# Patient Record
Sex: FEMALE | Race: WHITE | NOT HISPANIC OR LATINO | URBAN - METROPOLITAN AREA
[De-identification: names, ages, dates, MRNs, and addresses within clinical notes are randomized per-mention and may not be internally consistent; named-entity substitution may affect disease eponyms.]

---

## 2017-03-23 ENCOUNTER — OFFICE (OUTPATIENT)
Dept: URBAN - METROPOLITAN AREA CLINIC 11 | Facility: CLINIC | Age: 67
End: 2017-03-23
Payer: MEDICARE

## 2017-03-23 VITALS
HEIGHT: 66 IN | SYSTOLIC BLOOD PRESSURE: 147 MMHG | WEIGHT: 230 LBS | HEART RATE: 76 BPM | DIASTOLIC BLOOD PRESSURE: 62 MMHG

## 2017-03-23 DIAGNOSIS — R10.13 EPIGASTRIC PAIN: ICD-10-CM

## 2017-03-23 DIAGNOSIS — R13.19 OTHER DYSPHAGIA: ICD-10-CM

## 2017-03-23 PROCEDURE — 99213 OFFICE O/P EST LOW 20 MIN: CPT | Performed by: INTERNAL MEDICINE

## 2017-03-23 PROCEDURE — G8427 DOCREV CUR MEDS BY ELIG CLIN: HCPCS | Performed by: INTERNAL MEDICINE

## 2017-03-23 NOTE — SERVICENOTES
We discussed her abdominal pain and dysphagia.  I would like to do the EGD with dilation (?occult ring or motility issue) and to evaulate for gastritis/ulcer disease/candidiasis, etc....  We reveiewed her prior studies and discussed the EGD/dilation in detail.

## 2017-03-23 NOTE — SERVICEHPINOTES
She has had issues again with foods feeling like the stop in her mid chest. She will then drink water and it "feels like the water goes around it".  Sometimes the liquid will come back up but not the food.  This happenns about twice a week.  She has not had heartburn issues.  Her last EGD was in 2/16 without strictures.  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  She has the symptoms after eating and feels like she is going to explode.  She started having issues abou a month ago.  She has not had associated n/v with it.  She has not been on NSAIDs but was started on Remicade in February. She has been on MXT.  The pain comes and goes.  Sometimes he pain is in the RUQ as well. She has had a cholecytectomy but the pain is similar to her gall bladder pain.

## 2017-03-27 ENCOUNTER — AMBULATORY SURGICAL CENTER (OUTPATIENT)
Dept: URBAN - METROPOLITAN AREA SURGERY 3 | Facility: SURGERY | Age: 67
End: 2017-03-27
Payer: MEDICARE

## 2017-03-27 ENCOUNTER — AMBULATORY SURGICAL CENTER (OUTPATIENT)
Dept: URBAN - METROPOLITAN AREA SURGERY 3 | Facility: SURGERY | Age: 67
End: 2017-03-27

## 2017-03-27 ENCOUNTER — OFFICE (OUTPATIENT)
Dept: URBAN - METROPOLITAN AREA CLINIC 11 | Facility: CLINIC | Age: 67
End: 2017-03-27

## 2017-03-27 VITALS
OXYGEN SATURATION: 98 % | HEART RATE: 66 BPM | DIASTOLIC BLOOD PRESSURE: 76 MMHG | RESPIRATION RATE: 18 BRPM | OXYGEN SATURATION: 95 % | HEART RATE: 65 BPM | DIASTOLIC BLOOD PRESSURE: 70 MMHG | HEART RATE: 64 BPM | DIASTOLIC BLOOD PRESSURE: 67 MMHG | RESPIRATION RATE: 16 BRPM | OXYGEN SATURATION: 96 % | RESPIRATION RATE: 18 BRPM | HEART RATE: 63 BPM | RESPIRATION RATE: 16 BRPM | SYSTOLIC BLOOD PRESSURE: 123 MMHG | DIASTOLIC BLOOD PRESSURE: 76 MMHG | SYSTOLIC BLOOD PRESSURE: 162 MMHG | SYSTOLIC BLOOD PRESSURE: 133 MMHG | SYSTOLIC BLOOD PRESSURE: 123 MMHG | DIASTOLIC BLOOD PRESSURE: 57 MMHG | DIASTOLIC BLOOD PRESSURE: 59 MMHG | DIASTOLIC BLOOD PRESSURE: 67 MMHG | DIASTOLIC BLOOD PRESSURE: 59 MMHG | OXYGEN SATURATION: 95 % | HEIGHT: 66 IN | OXYGEN SATURATION: 98 % | SYSTOLIC BLOOD PRESSURE: 144 MMHG | SYSTOLIC BLOOD PRESSURE: 162 MMHG | HEART RATE: 63 BPM | HEART RATE: 65 BPM | WEIGHT: 232 LBS | OXYGEN SATURATION: 96 % | HEIGHT: 66 IN | TEMPERATURE: 97.3 F | HEART RATE: 66 BPM | TEMPERATURE: 97.8 F | OXYGEN SATURATION: 97 % | HEART RATE: 64 BPM | OXYGEN SATURATION: 97 % | SYSTOLIC BLOOD PRESSURE: 133 MMHG | SYSTOLIC BLOOD PRESSURE: 128 MMHG | DIASTOLIC BLOOD PRESSURE: 57 MMHG | TEMPERATURE: 97.8 F | SYSTOLIC BLOOD PRESSURE: 144 MMHG | DIASTOLIC BLOOD PRESSURE: 70 MMHG | TEMPERATURE: 97.3 F | WEIGHT: 232 LBS | SYSTOLIC BLOOD PRESSURE: 128 MMHG

## 2017-03-27 DIAGNOSIS — B37.81 CANDIDAL ESOPHAGITIS: ICD-10-CM

## 2017-03-27 DIAGNOSIS — K20.9 ESOPHAGITIS, UNSPECIFIED: ICD-10-CM

## 2017-03-27 DIAGNOSIS — K44.9 DIAPHRAGMATIC HERNIA WITHOUT OBSTRUCTION OR GANGRENE: ICD-10-CM

## 2017-03-27 DIAGNOSIS — K29.70 GASTRITIS, UNSPECIFIED, WITHOUT BLEEDING: ICD-10-CM

## 2017-03-27 DIAGNOSIS — R13.10 DYSPHAGIA, UNSPECIFIED: ICD-10-CM

## 2017-03-27 PROCEDURE — G8918 PT W/O PREOP ORDER IV AB PRO: HCPCS | Performed by: INTERNAL MEDICINE

## 2017-03-27 PROCEDURE — 43239 EGD BIOPSY SINGLE/MULTIPLE: CPT | Performed by: INTERNAL MEDICINE

## 2017-03-27 PROCEDURE — G8907 PT DOC NO EVENTS ON DISCHARG: HCPCS | Performed by: INTERNAL MEDICINE

## 2017-03-27 PROCEDURE — 88312 SPECIAL STAINS GROUP 1: CPT | Performed by: INTERNAL MEDICINE

## 2017-03-27 PROCEDURE — 43450 DILATE ESOPHAGUS 1/MULT PASS: CPT | Performed by: INTERNAL MEDICINE

## 2017-03-27 PROCEDURE — 88305 TISSUE EXAM BY PATHOLOGIST: CPT | Performed by: INTERNAL MEDICINE

## 2017-05-22 ENCOUNTER — OFFICE (OUTPATIENT)
Dept: URBAN - METROPOLITAN AREA CLINIC 11 | Facility: CLINIC | Age: 67
End: 2017-05-22
Payer: MEDICARE

## 2017-05-22 VITALS
SYSTOLIC BLOOD PRESSURE: 140 MMHG | HEIGHT: 66 IN | RESPIRATION RATE: 16 BRPM | DIASTOLIC BLOOD PRESSURE: 70 MMHG | HEART RATE: 62 BPM | WEIGHT: 237 LBS

## 2017-05-22 DIAGNOSIS — R13.10 DYSPHAGIA, UNSPECIFIED: ICD-10-CM

## 2017-05-22 PROCEDURE — G8427 DOCREV CUR MEDS BY ELIG CLIN: HCPCS | Performed by: INTERNAL MEDICINE

## 2017-05-22 PROCEDURE — 99213 OFFICE O/P EST LOW 20 MIN: CPT | Performed by: INTERNAL MEDICINE

## 2017-05-22 NOTE — SERVICEHPINOTES
She present for f/u of her dysphagia and candida.  She completed her antifungals without issues.  She has had two episodes of feeling that food was stopping in the lower esophagus with spasm.  This had been a couple of times a week prior to her dilation.  She had has not had n/v.  She has not had heartburn or reflux issues.  She had a esophagram with some C4 protrusion.  She does not have issues eating soups but meats and solid foods can cause difficulties with the dysphagia.  Sometimes she may have an "intense" spasm with the dysphagia.  She has had a Nissen in 2005 for reflux. .

## 2017-05-22 NOTE — SERVICENOTES
We discussed her dysphagia, prior Nissen, GERD history, improvement with dilation, and potential of a motility issue (achalasia, nutcraker esophagus...). I will order the manometry studies.

## 2017-06-14 ENCOUNTER — OFFICE (OUTPATIENT)
Dept: URBAN - METROPOLITAN AREA CLINIC 11 | Facility: CLINIC | Age: 67
End: 2017-06-14

## 2017-06-14 DIAGNOSIS — R13.10 DYSPHAGIA, UNSPECIFIED: ICD-10-CM

## 2017-06-14 PROCEDURE — 91010 ESOPHAGUS MOTILITY STUDY: CPT

## 2017-08-21 ENCOUNTER — OFFICE (OUTPATIENT)
Dept: URBAN - METROPOLITAN AREA CLINIC 11 | Facility: CLINIC | Age: 67
End: 2017-08-21
Payer: MEDICARE

## 2017-08-21 VITALS
HEIGHT: 66 IN | DIASTOLIC BLOOD PRESSURE: 64 MMHG | SYSTOLIC BLOOD PRESSURE: 143 MMHG | WEIGHT: 244 LBS | HEART RATE: 52 BPM

## 2017-08-21 DIAGNOSIS — R13.11 DYSPHAGIA, ORAL PHASE: ICD-10-CM

## 2017-08-21 PROCEDURE — 99213 OFFICE O/P EST LOW 20 MIN: CPT | Performed by: INTERNAL MEDICINE

## 2017-08-21 PROCEDURE — G8427 DOCREV CUR MEDS BY ELIG CLIN: HCPCS | Performed by: INTERNAL MEDICINE

## 2017-10-23 ENCOUNTER — OFFICE (OUTPATIENT)
Dept: URBAN - METROPOLITAN AREA CLINIC 11 | Facility: CLINIC | Age: 67
End: 2017-10-23
Payer: MEDICARE

## 2017-10-23 VITALS
HEART RATE: 57 BPM | DIASTOLIC BLOOD PRESSURE: 83 MMHG | HEIGHT: 66 IN | SYSTOLIC BLOOD PRESSURE: 139 MMHG | WEIGHT: 242 LBS

## 2017-10-23 DIAGNOSIS — K21.9 GASTRO-ESOPHAGEAL REFLUX DISEASE WITHOUT ESOPHAGITIS: ICD-10-CM

## 2017-10-23 DIAGNOSIS — R13.19 OTHER DYSPHAGIA: ICD-10-CM

## 2017-10-23 PROCEDURE — G8427 DOCREV CUR MEDS BY ELIG CLIN: HCPCS | Performed by: INTERNAL MEDICINE

## 2017-10-23 PROCEDURE — 99213 OFFICE O/P EST LOW 20 MIN: CPT | Performed by: INTERNAL MEDICINE

## 2017-10-23 RX ORDER — DEXLANSOPRAZOLE 60 MG/1
CAPSULE, DELAYED RELEASE ORAL
Qty: 90 | Refills: 3 | Status: COMPLETED
Start: 2017-10-23 | End: 2018-06-15

## 2017-10-23 NOTE — SERVICENOTES
We discussed her atypical GERD with esophageal spasm, reviewed her meds, and discussed her diet.  I would restart the Dexilant which had worked well for her.  She had issues with abdomninal pain with Prilosec and breakthough on Protonix.

## 2017-10-23 NOTE — SERVICEHPINOTES
She has still had some issues with intermittent "spasms" in her mid to upper chest.  She stated that when it happens, it occurs while eating.  The food will seem to stop and she will have an intesense spams.  Water is able to pass without issues and then she will have regurgitation.  Afterward the symptoms stop.  She has the symptoms for a few minutes with the regurgitation coming and going about 5-6 times in a row.  She is then able to eat again without issues.  She staed that this has decreased in occurence and it has happened a couple of times since her last visit.  She has also had a couple of issues with reflux at night which was stopped with Tums. She had not had the dysphagia while on the Dexilant last year.  She has had a normal esophagus on EGD, a dilation with a 56 Rapp, manometry with two spasms, and normal MBS.

## 2018-06-15 ENCOUNTER — OFFICE (OUTPATIENT)
Dept: URBAN - METROPOLITAN AREA CLINIC 11 | Facility: CLINIC | Age: 68
End: 2018-06-15

## 2018-06-15 VITALS
DIASTOLIC BLOOD PRESSURE: 71 MMHG | SYSTOLIC BLOOD PRESSURE: 165 MMHG | WEIGHT: 259 LBS | HEART RATE: 52 BPM | HEIGHT: 65 IN

## 2018-06-15 DIAGNOSIS — R13.19 OTHER DYSPHAGIA: ICD-10-CM

## 2018-06-15 PROCEDURE — 99213 OFFICE O/P EST LOW 20 MIN: CPT | Performed by: INTERNAL MEDICINE

## 2018-06-15 RX ORDER — DEXLANSOPRAZOLE 60 MG/1
60 CAPSULE, DELAYED RELEASE ORAL
Qty: 180 | Refills: 3 | Status: ACTIVE
Start: 2018-06-15

## 2018-06-15 NOTE — SERVICEHPINOTES
She presents for f/u of her dysphagia (which seems to be related to her prior C-spine surgery and GERD).  She has tried Prevacid but did not help.  She has noted an improvement in her heartburn since returning from [place] however.  She has had some issues with regurgitation of foods and liquids.  She has still had the sensation of food swallowing or sticking in the bottom of her throat intermittently.  She has been having difficulty getting the Dexilant but did have forms today which was signed. She is scheduled for a lower back surgery shortly with Dr. Broderick.  She has had recurrent UTI issues and is seeing urology next week.

## 2018-10-15 ENCOUNTER — OFFICE (OUTPATIENT)
Dept: URBAN - METROPOLITAN AREA CLINIC 11 | Facility: CLINIC | Age: 68
End: 2018-10-15

## 2018-10-15 VITALS
HEIGHT: 65 IN | SYSTOLIC BLOOD PRESSURE: 140 MMHG | DIASTOLIC BLOOD PRESSURE: 70 MMHG | WEIGHT: 260 LBS | HEART RATE: 74 BPM

## 2018-10-15 DIAGNOSIS — K21.9 GASTRO-ESOPHAGEAL REFLUX DISEASE WITHOUT ESOPHAGITIS: ICD-10-CM

## 2018-10-15 PROCEDURE — 99213 OFFICE O/P EST LOW 20 MIN: CPT | Performed by: INTERNAL MEDICINE

## 2018-10-15 NOTE — SERVICEHPINOTES
She presents for f/u of her dysphagia.  She has not had particular issues while on the Dexilant.  She had recurrent dysphagia while on Protonix at [place].  Restarting the Dexilant stopped the issues again. Since our last visit she has had her spine surgery which she stated went well.  However, since then she has had issues with tachycardia and hypoxia.  She is being evaluated for pulmonary hypertension and is on continuous O2. She is using a walker. She has also had issues with bladder outlet difficulties and is seeing urology.

## 2019-04-15 ENCOUNTER — OFFICE (OUTPATIENT)
Dept: URBAN - METROPOLITAN AREA CLINIC 11 | Facility: CLINIC | Age: 69
End: 2019-04-15

## 2019-04-15 VITALS
DIASTOLIC BLOOD PRESSURE: 62 MMHG | SYSTOLIC BLOOD PRESSURE: 147 MMHG | WEIGHT: 236 LBS | HEIGHT: 65 IN | HEART RATE: 43 BPM

## 2019-04-15 DIAGNOSIS — K21.9 GASTRO-ESOPHAGEAL REFLUX DISEASE WITHOUT ESOPHAGITIS: ICD-10-CM

## 2019-04-15 PROCEDURE — 99213 OFFICE O/P EST LOW 20 MIN: CPT | Performed by: INTERNAL MEDICINE

## 2019-04-15 NOTE — SERVICEHPINOTES
She presents for f/u of her reflux and hx of dysphagia.  She has been on the Dexilant which had been working well.  She has issues with epigatric burning, sharp pain when she is off of the Dexilant.  She has not had symptoms while on the meds.  She has not had dysphagia. She ahs been on other PPIs and they have not worked well.  She stated that she has had a genetic test and stated she was a "fast metabolizer of PPIs".  She has a new O2 concentrator and is a bit more active.

## 2019-04-15 NOTE — SERVICENOTES
She does well when she is on her meds.  We have filled out the papers for assistance.  She has DNA testing that suggests she is a rapid metabolizer of PPIs, which would explain her issues with her meds.

## 2019-10-14 ENCOUNTER — OFFICE (OUTPATIENT)
Dept: URBAN - METROPOLITAN AREA CLINIC 11 | Facility: CLINIC | Age: 69
End: 2019-10-14

## 2019-10-14 VITALS
HEIGHT: 64 IN | WEIGHT: 251 LBS | DIASTOLIC BLOOD PRESSURE: 62 MMHG | SYSTOLIC BLOOD PRESSURE: 152 MMHG | HEART RATE: 75 BPM

## 2019-10-14 DIAGNOSIS — R13.19 OTHER DYSPHAGIA: ICD-10-CM

## 2019-10-14 PROCEDURE — 99213 OFFICE O/P EST LOW 20 MIN: CPT | Performed by: INTERNAL MEDICINE

## 2019-10-14 NOTE — SERVICENOTES
She has continued to have some intermittent difficulties with dysphagia, which may be motiltiy related.  She will need to continue on the Dexilant and I would like a f/u esophagram.

## 2019-10-14 NOTE — SERVICEHPINOTES
She presents for f/u f her GERD, PPI genetic metabolism issue (she is able to take Dexilant), esophageal motility issue, and dyspahgia.  She stated that she has had some difficulties with foods sticking for a very short time in the mid chest about twice a week.  This is usually with solid foods.  It has been better in the past on PPIs.  She had an EGD in 2017 as well as MBS, motility studies, and esophagram.  She had a dilation in 2017 with a 56F Rapp dilator. She has continued on the continuous O2.  BR

## 2019-12-20 ENCOUNTER — OFFICE (OUTPATIENT)
Dept: URBAN - METROPOLITAN AREA CLINIC 11 | Facility: CLINIC | Age: 69
End: 2019-12-20

## 2019-12-20 VITALS
HEART RATE: 66 BPM | SYSTOLIC BLOOD PRESSURE: 156 MMHG | DIASTOLIC BLOOD PRESSURE: 71 MMHG | HEIGHT: 64 IN | WEIGHT: 254 LBS

## 2019-12-20 DIAGNOSIS — K30 FUNCTIONAL DYSPEPSIA: ICD-10-CM

## 2019-12-20 DIAGNOSIS — K21.9 GASTRO-ESOPHAGEAL REFLUX DISEASE WITHOUT ESOPHAGITIS: ICD-10-CM

## 2019-12-20 PROCEDURE — 99213 OFFICE O/P EST LOW 20 MIN: CPT | Performed by: INTERNAL MEDICINE

## 2019-12-20 NOTE — SERVICEHPINOTES
She presents today for f/u with her GERD. She has of oxygen dependent COPD, Gastroparesis, esophageal motility issue, and dysphagia. Currently taking Dexilant and has been taking it consistently for a little over a week. Current symptoms consist of sharp epigastric pain when she's laying in bed at night for about 20 minutes. She has tried different things to help with the pain which include drinking water or Lactaid milk, and taking Tums. She takes Pepcid nightly which she states isn't helping. The tums is the only thing that helps with the pain. She had an episode of difficulty swallowing lemonade this past week which took several swallows to get the liquid. -ABRT-  She has had her esopahgram and did not have a delay or strictures.  There was suggestion of a motility issue. She had a fall in mud in November and had broken three ribs.  BR

## 2019-12-20 NOTE — SERVICENOTES
Pt was seen with ALON Harmon.  The pt was evalated and examined and I agre with the above.  She has ahd improvement in her symptoms on Dexilant (she has been on it for the past week).  I would continue the Dexilant and add the Tums at night.

## 2020-04-20 ENCOUNTER — OFFICE (OUTPATIENT)
Dept: URBAN - METROPOLITAN AREA CLINIC 11 | Facility: CLINIC | Age: 70
End: 2020-04-20

## 2020-04-20 VITALS
HEIGHT: 64 IN | DIASTOLIC BLOOD PRESSURE: 70 MMHG | HEART RATE: 75 BPM | SYSTOLIC BLOOD PRESSURE: 147 MMHG | WEIGHT: 264 LBS

## 2020-04-20 DIAGNOSIS — R13.19 OTHER DYSPHAGIA: ICD-10-CM

## 2020-04-20 DIAGNOSIS — K21.9 GASTRO-ESOPHAGEAL REFLUX DISEASE WITHOUT ESOPHAGITIS: ICD-10-CM

## 2020-04-20 PROCEDURE — 99213 OFFICE O/P EST LOW 20 MIN: CPT | Performed by: INTERNAL MEDICINE

## 2020-04-20 RX ORDER — DEXLANSOPRAZOLE 60 MG/1
60 CAPSULE, DELAYED RELEASE ORAL
Qty: 180 | Refills: 3 | Status: ACTIVE
Start: 2018-06-15

## 2020-04-20 NOTE — SERVICEHPINOTES
She presents for evaluati of abdominal pain.  She stated that she has been haivn gissues with an "epigastric pain at night, esophageal spasms, and a dull ache".  As to the pain she has both a sharp pain that has a regurgation component and also an intermittent dull pain across her abdomen.  She has found that taking Tums and drinking water helps the sharp pain and reflux after about 5 minutes.  She as not had a particular food that causes issues consistently. Sometimes the pain occurs at night.  She has had some "spasm" with "semicold but not cold cold liquids".  She has had solid foods stick in the mid to upper chest.  She will have wait for it to pass.  She will try to drinking water until it passes.  The dysphagia occurs about twice a week.  She had an esophagram in October with spams but not a stricture or ring.   She has severe COPD/interstitial lung disease.  She is on O2 5-6 Liters. She did have pneumonia after a cruise in January.

## 2020-04-20 NOTE — SERVICENOTES
We discussed her GERD and dyspahgia, which may be esophageal spams related but could be a ring not seen on esophagram.  With her O2 requirements, she is not a candidate for elective procedures.  We can try higher dosed PPIs and diet control for now.  She is on higher doses of steroids more recently and had her MXT stopped.

## 2020-06-22 ENCOUNTER — OFFICE (OUTPATIENT)
Dept: URBAN - METROPOLITAN AREA CLINIC 11 | Facility: CLINIC | Age: 70
End: 2020-06-22

## 2020-06-22 VITALS
WEIGHT: 264 LBS | HEIGHT: 64 IN | DIASTOLIC BLOOD PRESSURE: 58 MMHG | SYSTOLIC BLOOD PRESSURE: 117 MMHG | HEART RATE: 74 BPM

## 2020-06-22 DIAGNOSIS — K21.9 GASTRO-ESOPHAGEAL REFLUX DISEASE WITHOUT ESOPHAGITIS: ICD-10-CM

## 2020-06-22 DIAGNOSIS — R13.19 OTHER DYSPHAGIA: ICD-10-CM

## 2020-06-22 PROCEDURE — 99213 OFFICE O/P EST LOW 20 MIN: CPT | Performed by: INTERNAL MEDICINE

## 2020-06-22 RX ORDER — PANTOPRAZOLE SODIUM 40 MG/1
40 TABLET, DELAYED RELEASE ORAL
Qty: 90 | Refills: 3 | Status: ACTIVE
Start: 2020-06-22

## 2020-06-22 NOTE — SERVICENOTES
We reviewd her esophagram reports, hx of GERD/esophageal spasms, trial of BID Dexilant, use of the Dexilant once daily, prior failures of other PPIs (alone), and prior trial of Pepcid at night which did not help either.  I would like to try the Dexilant in the morning with another PPI at night.  Of note she had been on Reglan as well.  We dsicussed telemed for her f/u.

## 2020-06-22 NOTE — SERVICEHPINOTES
"I tried the Dexilant twice a day but it made my stomach hurt."  She has gone back to Dexilant once daily and then Tums at night.  This has helped but she still has some difficulties with heartburn and esophageal spasms "a couple of times a week".  She has not noted a particular trigger.  She has been on several different meds over time.She has continued on the home O2 and is on continuous O2.  She is upto 7liters at times due to her feelings of SOB.BR

## 2020-09-22 ENCOUNTER — OFFICE (OUTPATIENT)
Dept: URBAN - METROPOLITAN AREA CLINIC 11 | Facility: CLINIC | Age: 70
End: 2020-09-22

## 2020-09-22 VITALS
DIASTOLIC BLOOD PRESSURE: 67 MMHG | OXYGEN SATURATION: 99 % | HEART RATE: 68 BPM | HEIGHT: 64 IN | WEIGHT: 274 LBS | SYSTOLIC BLOOD PRESSURE: 165 MMHG

## 2020-09-22 DIAGNOSIS — R13.19 OTHER DYSPHAGIA: ICD-10-CM

## 2020-09-22 DIAGNOSIS — K21.9 GASTRO-ESOPHAGEAL REFLUX DISEASE WITHOUT ESOPHAGITIS: ICD-10-CM

## 2020-09-22 PROCEDURE — 99214 OFFICE O/P EST MOD 30 MIN: CPT | Performed by: INTERNAL MEDICINE

## 2020-09-22 NOTE — SERVICEHPINOTES
"I fell of the front steps and broke a rib and since then it hurts to swallow when I eat."  She was in the hospital recently due to SOB issues with her underlying lung disease and was off of her reflux meds for a while which ahs some burning as well.  She stated that there is a pain after swalowing in her mid chest to lower chest "in my sternum" to moves around her lower chest to her back.  She has had food sticking in the lower chest and "then liquid gurgle up".  She has had an increase in her reflux during this time as well. She is on Dexilant in the mornings and Protonix at night.  This had worked well until her fall. She stated that she had CTs and CXR while inpt at [facility].

## 2020-09-22 NOTE — SERVICENOTES
We discussed her recurrent reflux, which may be in part due to her time off of her meds while inpt.  With her dyshpagia, I would do an esophagram and due to her O2 needs, I would like to avoid an egd if possible.

## 2020-11-25 VITALS — HEIGHT: 64 IN

## 2020-12-02 ENCOUNTER — OFFICE (OUTPATIENT)
Dept: URBAN - METROPOLITAN AREA CLINIC 11 | Facility: CLINIC | Age: 70
End: 2020-12-02

## 2020-12-03 ENCOUNTER — OFFICE (OUTPATIENT)
Dept: URBAN - METROPOLITAN AREA TELEHEALTH 10 | Facility: TELEHEALTH | Age: 70
End: 2020-12-03

## 2020-12-03 DIAGNOSIS — R13.19 OTHER DYSPHAGIA: ICD-10-CM

## 2020-12-03 DIAGNOSIS — R19.7 DIARRHEA, UNSPECIFIED: ICD-10-CM

## 2020-12-03 RX ORDER — METOCLOPRAMIDE 10 MG/1
TABLET ORAL
Qty: 90 | Refills: 3 | Status: ACTIVE
Start: 2020-12-03

## 2020-12-03 NOTE — SERVICENOTES
We discussed her meds, esophageal motility issues, and change in meds with the increase in the Reglan including potential issues with it.  She has had some diarrhea again which is controlled with Lomotil.  Stool studies have been submitted.  She is on hospice and not able to tolerate procedures.

Call 13:37.

## 2020-12-03 NOTE — SERVICEHPINOTES
She stated that she has been having issues with difficulties swallowing and with vomiting and diarrhea. She has been on Hospice and has been restarted on Reglan for her sx of nuasea and vomiting. She did not think that it had helped much.  She has not had tremors with it. She has had vomiting after eating but this is better if she uses a phenergan suppository.  She has not tried the oral phenergan that she recalls.  She has been on Zofran but it has not helped. She has contiued to have difficulties with solid foods and at times water.  She has been tolerating soups, rices, mashed potatoes, bananas, apple sauces.  She has been on Dexilant in the AM and Protonix at night (coverage issues). They have helped with the reflux but not the dysphagia.  She had her esophagram and had a dysmotility issue but no strictures.  Changes of her NISSEN were noted.Her CT revealed a stable dilation of her CBD suggestive of a post delicia change.  She has been using Lomotil to control the diarrhea.

## 2024-02-15 NOTE — SERVICENOTES
Tirzepatide (MOUNJARO) 10 MG/0.5ML Subcutaneous Solution Pen-injector, Inject 10 mg into the skin once a week., Disp: 6 mL, Rfl: 0    KEY:[id number]   We discussed her dysphagia and GERD issues and that as to the C-spine surgical changes, that she will likely have some dysphagia longterm.  At this point it would be unlikely that a repeat dilation would help (no improvement after the last trial).  I would restart the Dexilant, which has been approved.